# Patient Record
(demographics unavailable — no encounter records)

---

## 2025-02-19 NOTE — ASSESSMENT
[FreeTextEntry1] : 39-year-old male who presents with several months of dysuria - Labs from ED reviewed, summarized in HPI.  Negative STI panel, UA negative for signs of infection, microscopic hematuria - Scrotal and renal bladder ultrasound personally reviewed.  No hydronephrosis.  29 cc prostate. Patient denies history of TURP (US not convincing for TUR defect despite read) - Doxycycline, flomax x 2 weeks - Return for cystoscopy - Warm baths, pelvic relaxation  IF above testing negative will recommend PFPT

## 2025-02-19 NOTE — HISTORY OF PRESENT ILLNESS
[FreeTextEntry1] : 39-year-old male presents in ER follow-up.  To be seen in the emergency department yesterday with dysuria  Scrotal ultrasound: Unremarkable, 4 mm left tunical calcification, 2 mm right tunical calcification Renal bladder ultrasound: No hydronephrosis, tumors.  Possible TUR defect  Labs: WBC count: 6.53 Creatinine: 0.68 UA: 1 RBC per high-powered field Hepatitis panel, syphilis: Negative HIV: Negative  He has been experiencing dysuria and pain w/ urination since December 16, 2024. Was initially told he had herpes but then reports the testing was negative. Also reports some pain behind the testicles. He reports the pain is also worse the day after ejaculating.  No inciting trauma. No sounding / placement of anything inside of his penis. 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

## 2025-02-19 NOTE — REASON FOR VISIT
[Initial Visit ___] : [unfilled] is here today for an initial visit  for [unfilled] [Language Line ] : provided by [employer]   [Interpreters_IDNumber] : Mary Ann [id number]

## 2025-02-19 NOTE — PHYSICAL EXAM
[Normal Appearance] : normal appearance [Edema] : no peripheral edema [Bowel Sounds] : normal bowel sounds [Abdomen Tenderness] : non-tender [Urethral Meatus] : meatus normal [Epididymis] : the epididymides were normal [Testes Tenderness] : no tenderness of the testes [Testes Mass (___cm)] : there were no testicular masses [Normal Station and Gait] : the gait and station were normal for the patient's age [] : no rash [de-identified] : prostate NOT tender, boggy. Tender w/ palpation of pelvic floor musculature

## 2025-03-16 NOTE — HISTORY OF PRESENT ILLNESS
[FreeTextEntry1] : This telephonic visit was provided via audio only technology. The patient, was located at [facility] at the time of the visit.  The provider, Sebastien Patel, was located at [place], [place] at the time of the visit. The patient and Provider participated in the telephonic visit.  Verbal consent for telephonic services was given on 3/10/2025 by the patient.   The patient-doctor relationship has been established in a face-to-face fashion via real time video/audio HIPAA compliant communication using telemedicine software. The patient's identity has been confirmed. The patient was previously emailed a copy of the telemedicine consent. They have had a chance to review and has now given verbal consent and has requested care to be assessed and treated via telemedicine. They understand there may be limitations in this process, and that they may need further follow-up care in the office and/or hospital settings.   40 y.o. M who presents for f/u  To review, he was seen as a new patient Feb 2025 w/ dysuria. He is scheduled for cystoscopy and presents today to discuss this

## 2025-03-16 NOTE — REASON FOR VISIT
[Follow-up Visit ___] : a follow-up visit  for [unfilled] [Language Line ] : provided by [employer]   [Interpreters_IDNumber] : augustin [id number]

## 2025-03-16 NOTE — HISTORY OF PRESENT ILLNESS
[FreeTextEntry1] : This telephonic visit was provided via audio only technology. The patient, was located at [facility] at the time of the visit.  The provider, Sebastien Patel, was located at [place], [place] at the time of the visit. The patient and Provider participated in the telephonic visit.  Verbal consent for telephonic services was given on 3/10/2025 by the patient.   The patient-doctor relationship has been established in a face-to-face fashion via real time video/audio HIPAA compliant communication using telemedicine software. The patient's identity has been confirmed. The patient was previously emailed a copy of the telemedicine consent. They have had a chance to review and has now given verbal consent and has requested care to be assessed and treated via telemedicine. They understand there may be limitations in this process, and that they may need further follow-up care in the office and/or hospital settings.   40 y.o. M who presents for f/u  To review, he was seen as a new patient Feb 2025 w/ dysuria. He is scheduled for cystoscopy and presents today to discuss this

## 2025-03-16 NOTE — REASON FOR VISIT
[Follow-up Visit ___] : a follow-up visit  for [unfilled] [Language Line ] : provided by [employer]   [Interpreters_IDNumber] : augustin [id number]

## 2025-03-16 NOTE — ASSESSMENT
[FreeTextEntry1] : 40 y.o. M w/ dysuria - UCx, G/C negative - Plan for cystoscopy as scheduled  Telehealth Consultation: 20 minutes 10 minutes reviewing his history and discussing prior results. 10 minutes discussing various treatment options and writing his note.

## 2025-04-14 NOTE — END OF VISIT
[] : Resident [FreeTextEntry3] : Seen and examined Films reviewed Lesion seems too posterior to access via TUR plan for aspiration / sclerosis

## 2025-04-14 NOTE — PHYSICAL EXAM
[General Appearance - Well Developed] : well developed [General Appearance - Well Nourished] : well nourished [] : no respiratory distress [Normal Station and Gait] : the gait and station were normal for the patient's age [Skin Color & Pigmentation] : normal skin color and pigmentation [No Focal Deficits] : no focal deficits

## 2025-04-14 NOTE — HISTORY OF PRESENT ILLNESS
[FreeTextEntry1] : 40-year-old male with no PMH presenting from Dr. Sebastien Arango office to review prostate MRI done for abnormal cystoscopic findings. 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 He returns today and endorses continued dysuria and frequency. Also notes that he has had episodes of testicular pain associated with voiding and ejaculation in the past. He endorses no changes in his symptoms and they have been persistent since December 2024.

## 2025-04-14 NOTE — ASSESSMENT
[FreeTextEntry1] : 40 year old male with dysuria in the setting of a prostatic utricle cyst  Plan: - Discussed r/b/a of going to the OR for a cystoscopy, possible transurethral resection vs transperineal aspiration of prostatic utricle cyst - UA/Ucx sent  - Booking form submitted  - GI referral sent for perirectal cyst

## 2025-06-02 NOTE — PHYSICAL EXAM
[Normal Appearance] : normal appearance [] : no respiratory distress [Abdomen Soft] : soft [Abdomen Tenderness] : non-tender [Costovertebral Angle Tenderness] : no ~M costovertebral angle tenderness [Urethral Meatus] : meatus normal [Penis Abnormality] : normal circumcised penis [Scrotum] : the scrotum was normal [Epididymis] : the epididymides were normal [Testes Tenderness] : no tenderness of the testes [Testes Mass (___cm)] : there were no testicular masses [Oriented To Time, Place, And Person] : oriented to person, place, and time

## 2025-06-02 NOTE — PHYSICAL EXAM
[Normal Appearance] : normal appearance [] : no respiratory distress [Abdomen Soft] : soft [Costovertebral Angle Tenderness] : no ~M costovertebral angle tenderness [Abdomen Tenderness] : non-tender [Urethral Meatus] : meatus normal [Penis Abnormality] : normal circumcised penis [Scrotum] : the scrotum was normal [Epididymis] : the epididymides were normal [Testes Tenderness] : no tenderness of the testes [Testes Mass (___cm)] : there were no testicular masses [Oriented To Time, Place, And Person] : oriented to person, place, and time

## 2025-06-09 NOTE — ASSESSMENT
[FreeTextEntry1] : 41 yo male s/p transperineal prostatic utricle cyst aspiration, with persistent frequency and dysuria although mildly improved  - ua/ucx  - pelvic floor pt - avoid spicey food / caffeine  - reassurance  - RTC in 6 months or as needed

## 2025-06-09 NOTE — HISTORY OF PRESENT ILLNESS
[FreeTextEntry1] : 40-year-old male with no PMH presenting from Dr. Sebastien Arango office to review prostate MRI done for abnormal cystoscopic findings. 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 He returns today and endorses continued dysuria and frequency. Also notes that he has had episodes of testicular pain associated with voiding and ejaculation in the past. He endorses no changes in his symptoms and they have been persistent since December 2024.  6/2: pt s/p 5/14transperineal prostatic utricle cyst aspiration. Pt states his dysuria and frequency persists although mildly improved . Also complaining of "sensitivity" and itchy around his glans. Denies gross hematuria, abdominal / flank pain, fevers or chills. Asks if his anxiety can be a contributing factor. Also concerned about herpes, although no visible lesions. Denies caffeine use. Does occasionally eat spicey food.

## 2025-06-09 NOTE — ASSESSMENT
[FreeTextEntry1] : 39 yo male s/p transperineal prostatic utricle cyst aspiration, with persistent frequency and dysuria although mildly improved  - ua/ucx  - pelvic floor pt - avoid spicey food / caffeine  - reassurance  - RTC in 6 months or as needed

## 2025-07-28 NOTE — HISTORY OF PRESENT ILLNESS
[FreeTextEntry1] : 40M w/ testicular pain and irritative LUTS found to have a a utricle cyst in his prostatic urethra s/p unroofing  (5/14) presents for follow-up. Since last visit pt continues to report persistent symptoms. Workup thus far have been negative w/ negative urine cultures. 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  [None] : no symptoms

## 2025-07-28 NOTE — ASSESSMENT
[FreeTextEntry1] : 40M w/ prostatic utricle cyst s/p unroofing and aspiration w/ continued dysuria and frequency. negative ua/culture  Recommendations  - Pelvic floor pt - F/u STI panel - RTC in 3 months